# Patient Record
Sex: MALE
[De-identification: names, ages, dates, MRNs, and addresses within clinical notes are randomized per-mention and may not be internally consistent; named-entity substitution may affect disease eponyms.]

---

## 2018-09-14 ENCOUNTER — HOSPITAL ENCOUNTER (OUTPATIENT)
Dept: HOSPITAL 53 - M WUC | Age: 58
End: 2018-09-14
Attending: PHYSICIAN ASSISTANT
Payer: COMMERCIAL

## 2018-09-14 DIAGNOSIS — R22.42: Primary | ICD-10-CM

## 2018-09-14 LAB
ALBUMIN: 3.9 GM/DL (ref 3.2–5.2)
ANION GAP: 10 MEQ/L (ref 8–16)
BLOOD UREA NITROGEN: 13 MG/DL (ref 7–18)
CALCIUM LEVEL: 9.3 MG/DL (ref 8.5–10.1)
CARBON DIOXIDE LEVEL: 27 MEQ/L (ref 21–32)
CHLORIDE LEVEL: 104 MEQ/L (ref 98–107)
CREATININE FOR GFR: 1.05 MG/DL (ref 0.7–1.3)
GFR SERPL CREATININE-BSD FRML MDRD: > 60 ML/MIN/{1.73_M2} (ref 56–?)
GLUCOSE, FASTING: 85 MG/DL (ref 70–100)
PHOSPHORUS LEVEL: 3.7 MG/DL (ref 2.5–4.9)
POTASSIUM SERUM: 4.7 MEQ/L (ref 3.5–5.1)
SODIUM LEVEL: 141 MEQ/L (ref 136–145)
URIC ACID: 8.5 MG/DL (ref 3.5–7.2)

## 2018-09-14 PROCEDURE — 84550 ASSAY OF BLOOD/URIC ACID: CPT

## 2018-10-18 ENCOUNTER — HOSPITAL ENCOUNTER (OUTPATIENT)
Dept: HOSPITAL 53 - M WUC | Age: 58
End: 2018-10-18
Attending: PODIATRIST
Payer: COMMERCIAL

## 2018-10-18 DIAGNOSIS — B35.1: ICD-10-CM

## 2018-10-18 DIAGNOSIS — Z79.899: Primary | ICD-10-CM

## 2018-10-18 LAB
ALBUMIN/GLOBULIN RATIO: 0.95 (ref 1–1.93)
ALBUMIN: 3.7 GM/DL (ref 3.2–5.2)
ALKALINE PHOSPHATASE: 90 U/L (ref 45–117)
ALT SERPL W P-5'-P-CCNC: 40 U/L (ref 12–78)
ANION GAP: 8 MEQ/L (ref 8–16)
AST SERPL-CCNC: 29 U/L (ref 7–37)
BILIRUB CONJ SERPL-MCNC: 0.2 MG/DL (ref 0–0.2)
BILIRUBIN,TOTAL: 0.6 MG/DL (ref 0.2–1)
BLOOD UREA NITROGEN: 15 MG/DL (ref 7–18)
CALCIUM LEVEL: 8.9 MG/DL (ref 8.5–10.1)
CARBON DIOXIDE LEVEL: 30 MEQ/L (ref 21–32)
CHLORIDE LEVEL: 103 MEQ/L (ref 98–107)
CREATININE FOR GFR: 0.99 MG/DL (ref 0.7–1.3)
GFR SERPL CREATININE-BSD FRML MDRD: > 60 ML/MIN/{1.73_M2} (ref 56–?)
GLUCOSE, FASTING: 64 MG/DL (ref 70–100)
HEMATOCRIT: 50.6 % (ref 42–52)
HEMOGLOBIN: 17.1 G/DL (ref 13.5–17.5)
MEAN CORPUSCULAR HEMOGLOBIN: 30.4 PG (ref 27–33)
MEAN CORPUSCULAR HGB CONC: 33.8 G/DL (ref 32–36.5)
MEAN CORPUSCULAR VOLUME: 90 FL (ref 80–96)
NRBC BLD AUTO-RTO: 0 % (ref 0–0)
PHOSPHORUS LEVEL: 3.3 MG/DL (ref 2.5–4.9)
PLATELET COUNT, AUTOMATED: 259 10^3/UL (ref 150–450)
POTASSIUM SERUM: 4.2 MEQ/L (ref 3.5–5.1)
RED BLOOD COUNT: 5.62 10^6/UL (ref 4.3–6.1)
RED CELL DISTRIBUTION WIDTH: 13.4 % (ref 11.5–14.5)
SODIUM LEVEL: 141 MEQ/L (ref 136–145)
TOTAL PROTEIN: 7.6 GM/DL (ref 6.4–8.2)
WHITE BLOOD COUNT: 8.1 10^3/UL (ref 4–10)

## 2018-10-18 PROCEDURE — 80076 HEPATIC FUNCTION PANEL: CPT

## 2018-11-26 ENCOUNTER — HOSPITAL ENCOUNTER (OUTPATIENT)
Dept: HOSPITAL 53 - M WUC | Age: 58
End: 2018-11-26
Attending: PODIATRIST
Payer: COMMERCIAL

## 2018-11-26 DIAGNOSIS — Z79.899: Primary | ICD-10-CM

## 2018-11-26 LAB
ALBUMIN/GLOBULIN RATIO: 0.92 (ref 1–1.93)
ALBUMIN: 3.5 GM/DL (ref 3.2–5.2)
ALKALINE PHOSPHATASE: 82 U/L (ref 45–117)
ALT SERPL W P-5'-P-CCNC: 34 U/L (ref 12–78)
ANION GAP: 7 MEQ/L (ref 8–16)
AST SERPL-CCNC: 17 U/L (ref 7–37)
BILIRUB CONJ SERPL-MCNC: 0.1 MG/DL (ref 0–0.2)
BILIRUBIN,TOTAL: 0.5 MG/DL (ref 0.2–1)
BLOOD UREA NITROGEN: 20 MG/DL (ref 7–18)
CALCIUM LEVEL: 8.5 MG/DL (ref 8.5–10.1)
CARBON DIOXIDE LEVEL: 31 MEQ/L (ref 21–32)
CHLORIDE LEVEL: 105 MEQ/L (ref 98–107)
CREATININE FOR GFR: 0.97 MG/DL (ref 0.7–1.3)
GFR SERPL CREATININE-BSD FRML MDRD: > 60 ML/MIN/{1.73_M2} (ref 56–?)
GLUCOSE, FASTING: 112 MG/DL (ref 70–100)
HEMATOCRIT: 48.9 % (ref 42–52)
HEMOGLOBIN: 16.5 G/DL (ref 13.5–17.5)
MEAN CORPUSCULAR HEMOGLOBIN: 31.4 PG (ref 27–33)
MEAN CORPUSCULAR HGB CONC: 33.7 G/DL (ref 32–36.5)
MEAN CORPUSCULAR VOLUME: 93.1 FL (ref 80–96)
NRBC BLD AUTO-RTO: 0 % (ref 0–0)
PHOSPHORUS LEVEL: 2.2 MG/DL (ref 2.5–4.9)
PLATELET COUNT, AUTOMATED: 177 10^3/UL (ref 150–450)
POTASSIUM SERUM: 4.5 MEQ/L (ref 3.5–5.1)
RED BLOOD COUNT: 5.25 10^6/UL (ref 4.3–6.1)
RED CELL DISTRIBUTION WIDTH: 13.8 % (ref 11.5–14.5)
SODIUM LEVEL: 143 MEQ/L (ref 136–145)
TOTAL PROTEIN: 7.3 GM/DL (ref 6.4–8.2)
WHITE BLOOD COUNT: 8.1 10^3/UL (ref 4–10)

## 2018-11-26 PROCEDURE — 80076 HEPATIC FUNCTION PANEL: CPT

## 2018-12-21 ENCOUNTER — HOSPITAL ENCOUNTER (OUTPATIENT)
Dept: HOSPITAL 53 - M LAB REF | Age: 58
End: 2018-12-21
Attending: INTERNAL MEDICINE
Payer: COMMERCIAL

## 2018-12-21 DIAGNOSIS — E29.1: Primary | ICD-10-CM

## 2018-12-21 DIAGNOSIS — M10.9: ICD-10-CM

## 2018-12-21 LAB
TESTOST SERPL-MCNC: 909 NG/DL (ref 241–827)
URATE SERPL-MCNC: 7.4 MG/DL (ref 3.5–7.2)

## 2019-01-30 ENCOUNTER — HOSPITAL ENCOUNTER (EMERGENCY)
Dept: HOSPITAL 53 - M ED | Age: 59
Discharge: HOME | End: 2019-01-30
Payer: COMMERCIAL

## 2019-01-30 VITALS — HEIGHT: 67 IN | WEIGHT: 310.65 LBS | BODY MASS INDEX: 48.76 KG/M2

## 2019-01-30 VITALS — SYSTOLIC BLOOD PRESSURE: 146 MMHG | DIASTOLIC BLOOD PRESSURE: 76 MMHG

## 2019-01-30 DIAGNOSIS — S13.4XXA: ICD-10-CM
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
## 2019-01-30 DIAGNOSIS — T14.8XXA: ICD-10-CM

## 2019-01-30 DIAGNOSIS — S09.90XA: Primary | ICD-10-CM
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
## 2019-04-15 ENCOUNTER — HOSPITAL ENCOUNTER (OUTPATIENT)
Dept: HOSPITAL 53 - M LAB REF | Age: 59
End: 2019-04-15
Attending: INTERNAL MEDICINE
Payer: COMMERCIAL

## 2019-04-15 DIAGNOSIS — M10.072: Primary | ICD-10-CM

## 2019-07-24 ENCOUNTER — HOSPITAL ENCOUNTER (OUTPATIENT)
Dept: HOSPITAL 53 - M LAB REF | Age: 59
End: 2019-07-24
Attending: NURSE PRACTITIONER
Payer: COMMERCIAL

## 2019-07-24 DIAGNOSIS — I83.022: Primary | ICD-10-CM

## 2019-12-23 ENCOUNTER — HOSPITAL ENCOUNTER (OUTPATIENT)
Dept: HOSPITAL 53 - M LAB REF | Age: 59
End: 2019-12-23
Attending: INTERNAL MEDICINE
Payer: COMMERCIAL

## 2019-12-23 DIAGNOSIS — M10.072: Primary | ICD-10-CM

## 2019-12-23 DIAGNOSIS — E29.1: ICD-10-CM

## 2019-12-23 LAB
TESTOST SERPL-MCNC: 441 NG/DL (ref 241–827)
URATE SERPL-MCNC: 4.7 MG/DL (ref 3.5–7.2)

## 2020-10-13 ENCOUNTER — HOSPITAL ENCOUNTER (OUTPATIENT)
Dept: HOSPITAL 53 - M LAB REF | Age: 60
End: 2020-10-13
Attending: INTERNAL MEDICINE
Payer: COMMERCIAL

## 2020-10-13 DIAGNOSIS — M10.072: Primary | ICD-10-CM

## 2020-10-13 DIAGNOSIS — E29.1: ICD-10-CM

## 2020-10-13 LAB
TESTOST SERPL-MCNC: 336 NG/DL (ref 241–827)
URATE SERPL-MCNC: 5.2 MG/DL (ref 3.5–7.2)

## 2021-05-10 ENCOUNTER — HOSPITAL ENCOUNTER (OUTPATIENT)
Dept: HOSPITAL 53 - M LAB REF | Age: 61
End: 2021-05-10
Attending: INTERNAL MEDICINE
Payer: COMMERCIAL

## 2021-05-10 DIAGNOSIS — E29.1: Primary | ICD-10-CM

## 2021-11-16 ENCOUNTER — HOSPITAL ENCOUNTER (OUTPATIENT)
Dept: HOSPITAL 53 - M LAB REF | Age: 61
End: 2021-11-16
Attending: INTERNAL MEDICINE
Payer: COMMERCIAL

## 2021-11-16 DIAGNOSIS — E29.1: Primary | ICD-10-CM

## 2021-11-17 LAB
TESTOST FREE SERPL-MCNC: 15.6 PG/ML (ref 6.6–18.1)
TESTOST SERPL-MCNC: 309 NG/DL (ref 264–916)

## 2022-06-10 ENCOUNTER — HOSPITAL ENCOUNTER (OUTPATIENT)
Dept: HOSPITAL 53 - M LAB REF | Age: 62
End: 2022-06-10
Attending: INTERNAL MEDICINE
Payer: COMMERCIAL

## 2022-06-10 DIAGNOSIS — E29.1: Primary | ICD-10-CM

## 2022-06-11 LAB
TESTOST FREE SERPL-MCNC: 41.8 PG/ML (ref 6.6–18.1)
TESTOST SERPL-MCNC: 1231 NG/DL (ref 264–916)

## 2022-07-25 ENCOUNTER — HOSPITAL ENCOUNTER (OUTPATIENT)
Dept: HOSPITAL 53 - M LAB REF | Age: 62
End: 2022-07-25
Attending: INTERNAL MEDICINE
Payer: COMMERCIAL

## 2022-07-25 DIAGNOSIS — E29.1: Primary | ICD-10-CM

## 2022-07-26 LAB
TESTOST FREE SERPL-MCNC: 28.7 PG/ML (ref 6.6–18.1)
TESTOST SERPL-MCNC: 886 NG/DL (ref 264–916)

## 2022-09-15 ENCOUNTER — HOSPITAL ENCOUNTER (OUTPATIENT)
Dept: HOSPITAL 53 - M LAB REF | Age: 62
End: 2022-09-15
Attending: INTERNAL MEDICINE
Payer: COMMERCIAL

## 2022-09-15 DIAGNOSIS — E29.1: Primary | ICD-10-CM

## 2022-09-16 LAB
TESTOST FREE SERPL-MCNC: 19.5 PG/ML (ref 6.6–18.1)
TESTOST SERPL-MCNC: 523 NG/DL (ref 264–916)

## 2023-03-15 ENCOUNTER — HOSPITAL ENCOUNTER (OUTPATIENT)
Dept: HOSPITAL 53 - M WUC | Age: 63
End: 2023-03-15
Attending: STUDENT IN AN ORGANIZED HEALTH CARE EDUCATION/TRAINING PROGRAM
Payer: COMMERCIAL

## 2023-03-15 DIAGNOSIS — M10.9: Primary | ICD-10-CM

## 2023-03-15 DIAGNOSIS — M25.50: ICD-10-CM

## 2023-03-15 DIAGNOSIS — M79.641: ICD-10-CM

## 2023-06-15 ENCOUNTER — HOSPITAL ENCOUNTER (OUTPATIENT)
Dept: HOSPITAL 53 - M LAB REF | Age: 63
End: 2023-06-15
Attending: STUDENT IN AN ORGANIZED HEALTH CARE EDUCATION/TRAINING PROGRAM
Payer: COMMERCIAL

## 2023-06-15 DIAGNOSIS — R06.00: Primary | ICD-10-CM

## 2023-07-19 ENCOUNTER — HOSPITAL ENCOUNTER (OUTPATIENT)
Dept: HOSPITAL 53 - M CARPUL | Age: 63
End: 2023-07-19
Attending: STUDENT IN AN ORGANIZED HEALTH CARE EDUCATION/TRAINING PROGRAM
Payer: COMMERCIAL

## 2023-07-19 DIAGNOSIS — I35.0: Primary | ICD-10-CM

## 2023-07-19 DIAGNOSIS — R06.00: ICD-10-CM

## 2025-02-07 ENCOUNTER — HOSPITAL ENCOUNTER (EMERGENCY)
Dept: HOSPITAL 53 - M ED | Age: 65
Discharge: HOME | End: 2025-02-07
Payer: COMMERCIAL

## 2025-02-07 VITALS — BODY MASS INDEX: 46.26 KG/M2 | WEIGHT: 294.76 LBS | HEIGHT: 67 IN

## 2025-02-07 VITALS — SYSTOLIC BLOOD PRESSURE: 130 MMHG | DIASTOLIC BLOOD PRESSURE: 82 MMHG | OXYGEN SATURATION: 98 % | TEMPERATURE: 97.5 F

## 2025-02-07 DIAGNOSIS — Z79.1: ICD-10-CM

## 2025-02-07 DIAGNOSIS — R53.1: Primary | ICD-10-CM

## 2025-02-07 DIAGNOSIS — R11.0: ICD-10-CM

## 2025-02-07 DIAGNOSIS — I44.4: ICD-10-CM

## 2025-02-07 DIAGNOSIS — Z79.83: ICD-10-CM

## 2025-02-07 DIAGNOSIS — E78.5: ICD-10-CM

## 2025-02-07 DIAGNOSIS — Z79.899: ICD-10-CM

## 2025-02-07 LAB
ALBUMIN SERPL BCG-MCNC: 3.3 G/DL (ref 3.2–5.2)
ALP SERPL-CCNC: 76 U/L (ref 40–129)
ALT SERPL W P-5'-P-CCNC: 39 U/L (ref 7–40)
AST SERPL-CCNC: 56 U/L (ref ?–34)
BILIRUB SERPL-MCNC: 0.6 MG/DL (ref 0.3–1.2)
BUN SERPL-MCNC: 18 MG/DL (ref 9–23)
CALCIUM SERPL-MCNC: 8.9 MG/DL (ref 8.3–10.6)
CHLORIDE SERPL-SCNC: 100 MMOL/L (ref 98–107)
CO2 SERPL-SCNC: 30 MMOL/L (ref 20–31)
CREAT SERPL-MCNC: 1.02 MG/DL (ref 0.7–1.3)
GFR SERPL CREATININE-BSD FRML MDRD: > 60 ML/MIN/{1.73_M2} (ref 49–?)
GLUCOSE SERPL-MCNC: 187 MG/DL (ref 74–106)
HCT VFR BLD AUTO: 45.3 % (ref 42–52)
HGB BLD-MCNC: 15.7 G/DL (ref 13.5–17.5)
MAGNESIUM SERPL-MCNC: 1.6 MG/DL (ref 1.8–2.4)
MCH RBC QN AUTO: 31.8 PG (ref 27–33)
MCHC RBC AUTO-ENTMCNC: 34.7 G/DL (ref 32–36.5)
MCV RBC AUTO: 91.7 FL (ref 80–96)
PLATELET # BLD AUTO: 180 10^3/UL (ref 150–450)
POTASSIUM SERPL-SCNC: 3.8 MMOL/L (ref 3.5–5.1)
PROT SERPL-MCNC: 6.9 G/DL (ref 5.7–8.2)
RBC # BLD AUTO: 4.94 10^6/UL (ref 4.3–6.1)
SODIUM SERPL-SCNC: 138 MMOL/L (ref 136–145)
TSH SERPL DL<=0.005 MIU/L-ACNC: 4.01 UIU/ML (ref 0.55–4.78)
WBC # BLD AUTO: 8.8 10^3/UL (ref 4–10)

## 2025-02-07 RX ADMIN — ONDANSETRON ONE MG: 4 TABLET, ORALLY DISINTEGRATING ORAL at 03:59
